# Patient Record
Sex: FEMALE | Race: WHITE
[De-identification: names, ages, dates, MRNs, and addresses within clinical notes are randomized per-mention and may not be internally consistent; named-entity substitution may affect disease eponyms.]

---

## 2017-02-24 ENCOUNTER — RECORDS - HEALTHEAST (OUTPATIENT)
Dept: ADMINISTRATIVE | Facility: OTHER | Age: 32
End: 2017-02-24

## 2017-03-03 ENCOUNTER — TELEPHONE (OUTPATIENT)
Dept: NURSING | Facility: CLINIC | Age: 32
End: 2017-03-03

## 2017-03-03 NOTE — TELEPHONE ENCOUNTER
Call Type: Triage Call    Presenting Problem: Caller had a question regarding a medication  she's taking(non [facility] MD, nothing noted in epic) and an OTC med. I  advised her to contact the pharmacy.  Triage Note:  Guideline Title: Medication Questions - Adult  Recommended Disposition: Provide Health Information  Original Inclination: Wanted to speak with a nurse  Override Disposition:  Intended Action: Follow advice given  Physician Contacted: No  Caller has medication question(s) that was answered with available resources ?  YES  Pregnant and has medication questions regarding prescribed and/or nonprescribed  medication(s) not covered by available resources ? NO  Breastfeeding and has medication questions regarding prescribed and/or  nonprescribed medication(s) not covered by available resources ? NO  Requested information on how to safely dispose of  or unused medications ?  NO  Sign(s) or symptom(s) associated with a diagnosed condition or with a new illness  ? NO  Prescription ordered today and not available at pharmacy putting patient at  clinical risk ? NO  Recurrence of a symptom(s) or illness post prescribed medication treatment AND  provider instructed patient to call if symptom(s) returned. ? NO  Unable to obtain prescribed medication related to available resources AND  situation poses immediate clinical risk ? NO  Pharmacy calling to clarify prescription order. ? NO  Requests refill of prescribed medication that does NOT have a valid refill; lack  of medication may cause clinical risk to patient if not available. ? NO  Has questions about prescribed and/or nonprescribed medications not covered by  available resources ? NO  Pharmacy calling with prescription question; answered per department policy. ? NO  Requests refill of prescribed medication without valid refills OR requests refill  of prescribed medication with valid refills but does not have prescription number  (no RX container); lack of  medication does not put patient at clinical risk ? NO  Physician Instructions:  Care Advice:

## 2017-03-24 ENCOUNTER — RECORDS - HEALTHEAST (OUTPATIENT)
Dept: ADMINISTRATIVE | Facility: OTHER | Age: 32
End: 2017-03-24

## 2017-03-27 ENCOUNTER — COMMUNICATION - HEALTHEAST (OUTPATIENT)
Dept: FAMILY MEDICINE | Facility: CLINIC | Age: 32
End: 2017-03-27

## 2017-03-27 DIAGNOSIS — E78.5 HYPERLIPIDEMIA, UNSPECIFIED HYPERLIPIDEMIA TYPE: ICD-10-CM

## 2017-03-29 ENCOUNTER — AMBULATORY - HEALTHEAST (OUTPATIENT)
Dept: LAB | Facility: CLINIC | Age: 32
End: 2017-03-29

## 2017-03-29 DIAGNOSIS — E78.5 HYPERLIPIDEMIA, UNSPECIFIED HYPERLIPIDEMIA TYPE: ICD-10-CM

## 2017-03-29 LAB
CHOLEST SERPL-MCNC: 269 MG/DL
FASTING STATUS PATIENT QL REPORTED: YES
HDLC SERPL-MCNC: 74 MG/DL
LDLC SERPL CALC-MCNC: 185 MG/DL
TRIGL SERPL-MCNC: 48 MG/DL

## 2017-05-15 ENCOUNTER — OFFICE VISIT - HEALTHEAST (OUTPATIENT)
Dept: FAMILY MEDICINE | Facility: CLINIC | Age: 32
End: 2017-05-15

## 2017-05-15 DIAGNOSIS — S76.211A GROIN STRAIN, RIGHT, INITIAL ENCOUNTER: ICD-10-CM

## 2017-05-15 DIAGNOSIS — B18.2 CHRONIC HEPATITIS C WITHOUT HEPATIC COMA (H): ICD-10-CM

## 2017-05-15 DIAGNOSIS — E78.5 HYPERLIPIDEMIA, UNSPECIFIED HYPERLIPIDEMIA TYPE: ICD-10-CM

## 2017-05-16 ENCOUNTER — OFFICE VISIT - HEALTHEAST (OUTPATIENT)
Dept: ALLERGY | Facility: CLINIC | Age: 32
End: 2017-05-16

## 2017-05-16 DIAGNOSIS — J30.1 SEASONAL ALLERGIC RHINITIS DUE TO POLLEN: ICD-10-CM

## 2017-05-16 DIAGNOSIS — J30.9 ALLERGIC RHINOCONJUNCTIVITIS: ICD-10-CM

## 2017-05-16 DIAGNOSIS — H10.10 ALLERGIC RHINOCONJUNCTIVITIS: ICD-10-CM

## 2017-06-19 ENCOUNTER — RECORDS - HEALTHEAST (OUTPATIENT)
Dept: ADMINISTRATIVE | Facility: OTHER | Age: 32
End: 2017-06-19

## 2017-06-29 ENCOUNTER — RECORDS - HEALTHEAST (OUTPATIENT)
Dept: ADMINISTRATIVE | Facility: OTHER | Age: 32
End: 2017-06-29

## 2017-07-03 ENCOUNTER — RECORDS - HEALTHEAST (OUTPATIENT)
Dept: ADMINISTRATIVE | Facility: OTHER | Age: 32
End: 2017-07-03

## 2017-07-31 ENCOUNTER — RECORDS - HEALTHEAST (OUTPATIENT)
Dept: ADMINISTRATIVE | Facility: OTHER | Age: 32
End: 2017-07-31

## 2017-09-03 ENCOUNTER — HEALTH MAINTENANCE LETTER (OUTPATIENT)
Age: 32
End: 2017-09-03

## 2018-05-16 ENCOUNTER — OFFICE VISIT - HEALTHEAST (OUTPATIENT)
Dept: FAMILY MEDICINE | Facility: CLINIC | Age: 33
End: 2018-05-16

## 2018-05-16 DIAGNOSIS — Z80.3 FAMILY HISTORY OF BREAST CANCER IN FIRST DEGREE RELATIVE: ICD-10-CM

## 2018-05-16 DIAGNOSIS — R10.13 DYSPEPSIA: ICD-10-CM

## 2018-05-16 DIAGNOSIS — Z00.00 ROUTINE GENERAL MEDICAL EXAMINATION AT A HEALTH CARE FACILITY: ICD-10-CM

## 2018-05-16 LAB
CHOLEST SERPL-MCNC: 280 MG/DL
ERYTHROCYTE [DISTWIDTH] IN BLOOD BY AUTOMATED COUNT: 11.5 % (ref 11–14.5)
FASTING STATUS PATIENT QL REPORTED: NO
HCT VFR BLD AUTO: 43.5 % (ref 35–47)
HDLC SERPL-MCNC: 84 MG/DL
HGB BLD-MCNC: 14.7 G/DL (ref 12–16)
LDLC SERPL CALC-MCNC: 185 MG/DL
MCH RBC QN AUTO: 29.1 PG (ref 27–34)
MCHC RBC AUTO-ENTMCNC: 33.8 G/DL (ref 32–36)
MCV RBC AUTO: 86 FL (ref 80–100)
PLATELET # BLD AUTO: 292 THOU/UL (ref 140–440)
PMV BLD AUTO: 7.2 FL (ref 7–10)
RBC # BLD AUTO: 5.05 MILL/UL (ref 3.8–5.4)
TRIGL SERPL-MCNC: 56 MG/DL
TSH SERPL DL<=0.005 MIU/L-ACNC: 0.92 UIU/ML (ref 0.3–5)
WBC: 8.1 THOU/UL (ref 4–11)

## 2018-05-16 ASSESSMENT — MIFFLIN-ST. JEOR: SCORE: 1390.16

## 2018-05-17 LAB
HPV SOURCE: NORMAL
HUMAN PAPILLOMA VIRUS 16 DNA: NEGATIVE
HUMAN PAPILLOMA VIRUS 18 DNA: NEGATIVE
HUMAN PAPILLOMA VIRUS FINAL DIAGNOSIS: NORMAL
HUMAN PAPILLOMA VIRUS OTHER HR: NEGATIVE
SPECIMEN DESCRIPTION: NORMAL

## 2018-05-18 ENCOUNTER — COMMUNICATION - HEALTHEAST (OUTPATIENT)
Dept: ONCOLOGY | Facility: HOSPITAL | Age: 33
End: 2018-05-18

## 2018-05-22 LAB
BKR LAB AP ABNORMAL BLEEDING: NO
BKR LAB AP BIRTH CONTROL/HORMONES: NORMAL
BKR LAB AP CERVICAL APPEARANCE: NORMAL
BKR LAB AP GYN ADEQUACY: NORMAL
BKR LAB AP GYN INTERPRETATION: NORMAL
BKR LAB AP HPV REFLEX: NORMAL
BKR LAB AP LMP: NORMAL
BKR LAB AP PATIENT STATUS: NORMAL
BKR LAB AP PREVIOUS ABNORMAL: NO
BKR LAB AP PREVIOUS NORMAL: 2015
HIGH RISK?: NO
PATH REPORT.COMMENTS IMP SPEC: NORMAL
RESULT FLAG (HE HISTORICAL CONVERSION): NORMAL

## 2018-05-23 ENCOUNTER — RECORDS - HEALTHEAST (OUTPATIENT)
Dept: ADMINISTRATIVE | Facility: OTHER | Age: 33
End: 2018-05-23

## 2018-05-29 ENCOUNTER — RECORDS - HEALTHEAST (OUTPATIENT)
Dept: ADMINISTRATIVE | Facility: OTHER | Age: 33
End: 2018-05-29

## 2018-06-13 ENCOUNTER — COMMUNICATION - HEALTHEAST (OUTPATIENT)
Dept: ALLERGY | Facility: CLINIC | Age: 33
End: 2018-06-13

## 2018-06-13 DIAGNOSIS — J30.1 SEASONAL ALLERGIC RHINITIS DUE TO POLLEN: ICD-10-CM

## 2018-06-13 DIAGNOSIS — H10.10 ALLERGIC RHINOCONJUNCTIVITIS: ICD-10-CM

## 2018-06-13 DIAGNOSIS — J30.9 ALLERGIC RHINOCONJUNCTIVITIS: ICD-10-CM

## 2018-08-07 ENCOUNTER — COMMUNICATION - HEALTHEAST (OUTPATIENT)
Dept: ONCOLOGY | Facility: HOSPITAL | Age: 33
End: 2018-08-07

## 2018-11-02 ENCOUNTER — COMMUNICATION - HEALTHEAST (OUTPATIENT)
Dept: FAMILY MEDICINE | Facility: CLINIC | Age: 33
End: 2018-11-02

## 2018-11-13 ENCOUNTER — COMMUNICATION - HEALTHEAST (OUTPATIENT)
Dept: FAMILY MEDICINE | Facility: CLINIC | Age: 33
End: 2018-11-13

## 2018-11-15 ENCOUNTER — OFFICE VISIT - HEALTHEAST (OUTPATIENT)
Dept: FAMILY MEDICINE | Facility: CLINIC | Age: 33
End: 2018-11-15

## 2018-11-15 DIAGNOSIS — Z32.01 POSITIVE PREGNANCY TEST: ICD-10-CM

## 2018-11-15 DIAGNOSIS — N92.6 MISSED PERIOD: ICD-10-CM

## 2018-11-15 LAB — HCG UR QL: POSITIVE

## 2018-11-16 ENCOUNTER — HOSPITAL ENCOUNTER (OUTPATIENT)
Dept: ULTRASOUND IMAGING | Facility: HOSPITAL | Age: 33
Discharge: HOME | End: 2018-11-16
Attending: FAMILY MEDICINE

## 2018-11-16 DIAGNOSIS — Z32.01 POSITIVE PREGNANCY TEST: ICD-10-CM

## 2018-12-06 ENCOUNTER — PRENATAL OFFICE VISIT - HEALTHEAST (OUTPATIENT)
Dept: FAMILY MEDICINE | Facility: CLINIC | Age: 33
End: 2018-12-06

## 2018-12-06 DIAGNOSIS — B18.2 CHRONIC HEPATITIS C WITHOUT HEPATIC COMA (H): ICD-10-CM

## 2018-12-06 DIAGNOSIS — Z34.01 SUPERVISION OF NORMAL FIRST PREGNANCY IN FIRST TRIMESTER: ICD-10-CM

## 2018-12-06 LAB
ALBUMIN SERPL-MCNC: 3.7 G/DL (ref 3.5–5)
ALBUMIN UR-MCNC: NEGATIVE MG/DL
ALP SERPL-CCNC: 46 U/L (ref 45–120)
ALT SERPL W P-5'-P-CCNC: <9 U/L (ref 0–45)
APPEARANCE UR: ABNORMAL
AST SERPL W P-5'-P-CCNC: 10 U/L (ref 0–40)
BASOPHILS # BLD AUTO: 0.1 THOU/UL (ref 0–0.2)
BASOPHILS NFR BLD AUTO: 1 % (ref 0–2)
BILIRUB DIRECT SERPL-MCNC: 0.1 MG/DL
BILIRUB SERPL-MCNC: 0.4 MG/DL (ref 0–1)
BILIRUB UR QL STRIP: NEGATIVE
COLOR UR AUTO: YELLOW
EOSINOPHIL # BLD AUTO: 0.1 THOU/UL (ref 0–0.4)
EOSINOPHIL NFR BLD AUTO: 1 % (ref 0–6)
ERYTHROCYTE [DISTWIDTH] IN BLOOD BY AUTOMATED COUNT: 13.5 % (ref 11–14.5)
GLUCOSE UR STRIP-MCNC: NEGATIVE MG/DL
HCT VFR BLD AUTO: 39.6 % (ref 35–47)
HGB BLD-MCNC: 13.4 G/DL (ref 12–16)
HGB UR QL STRIP: NEGATIVE
HIV 1+2 AB+HIV1 P24 AG SERPL QL IA: NEGATIVE
KETONES UR STRIP-MCNC: NEGATIVE MG/DL
LEUKOCYTE ESTERASE UR QL STRIP: NEGATIVE
LYMPHOCYTES # BLD AUTO: 2.7 THOU/UL (ref 0.8–4.4)
LYMPHOCYTES NFR BLD AUTO: 26 % (ref 20–40)
MCH RBC QN AUTO: 29.1 PG (ref 27–34)
MCHC RBC AUTO-ENTMCNC: 33.8 G/DL (ref 32–36)
MCV RBC AUTO: 86 FL (ref 80–100)
MONOCYTES # BLD AUTO: 0.6 THOU/UL (ref 0–0.9)
MONOCYTES NFR BLD AUTO: 6 % (ref 2–10)
NEUTROPHILS # BLD AUTO: 7.1 THOU/UL (ref 2–7.7)
NEUTROPHILS NFR BLD AUTO: 68 % (ref 50–70)
NITRATE UR QL: NEGATIVE
PH UR STRIP: 6.5 [PH] (ref 5–8)
PLATELET # BLD AUTO: 283 THOU/UL (ref 140–440)
PMV BLD AUTO: 10 FL (ref 8.5–12.5)
PROT SERPL-MCNC: 6.9 G/DL (ref 6–8)
RBC # BLD AUTO: 4.61 MILL/UL (ref 3.8–5.4)
SP GR UR STRIP: 1.02 (ref 1–1.03)
UROBILINOGEN UR STRIP-ACNC: ABNORMAL
WBC: 10.6 THOU/UL (ref 4–11)

## 2018-12-07 ENCOUNTER — AMBULATORY - HEALTHEAST (OUTPATIENT)
Dept: LAB | Facility: CLINIC | Age: 33
End: 2018-12-07

## 2018-12-07 DIAGNOSIS — Z33.1 PREGNANCY, INCIDENTAL: ICD-10-CM

## 2018-12-07 LAB
ABO/RH(D): NORMAL
ABORH REPEAT: NORMAL
ANTIBODY SCREEN: NEGATIVE
BACTERIA SPEC CULT: NO GROWTH
HBV SURFACE AG SERPL QL IA: NEGATIVE
RUBV IGG SERPL QL IA: POSITIVE
T PALLIDUM AB SER QL: NEGATIVE
VZV IGG SER QL IA: POSITIVE

## 2018-12-10 LAB
HCV RNA SERPL NAA+PROBE-ACNC: NORMAL [IU]/ML
HCV RNA SERPL NAA+PROBE-LOG IU: NORMAL LOG IU/ML

## 2018-12-16 ENCOUNTER — RECORDS - HEALTHEAST (OUTPATIENT)
Dept: ADMINISTRATIVE | Facility: OTHER | Age: 33
End: 2018-12-16

## 2018-12-17 ENCOUNTER — COMMUNICATION - HEALTHEAST (OUTPATIENT)
Dept: FAMILY MEDICINE | Facility: CLINIC | Age: 33
End: 2018-12-17

## 2019-01-03 ENCOUNTER — PRENATAL OFFICE VISIT - HEALTHEAST (OUTPATIENT)
Dept: FAMILY MEDICINE | Facility: CLINIC | Age: 34
End: 2019-01-03

## 2019-01-03 DIAGNOSIS — K59.00 CONSTIPATION, UNSPECIFIED CONSTIPATION TYPE: ICD-10-CM

## 2019-01-03 DIAGNOSIS — Z34.02 SUPERVISION OF NORMAL FIRST PREGNANCY IN SECOND TRIMESTER: ICD-10-CM

## 2019-06-02 ENCOUNTER — COMMUNICATION - HEALTHEAST (OUTPATIENT)
Dept: ALLERGY | Facility: CLINIC | Age: 34
End: 2019-06-02

## 2019-06-02 DIAGNOSIS — H10.10 ALLERGIC RHINOCONJUNCTIVITIS: ICD-10-CM

## 2019-06-02 DIAGNOSIS — J30.9 ALLERGIC RHINOCONJUNCTIVITIS: ICD-10-CM

## 2019-06-02 DIAGNOSIS — J30.1 SEASONAL ALLERGIC RHINITIS DUE TO POLLEN: ICD-10-CM

## 2019-07-14 ENCOUNTER — HOSPITAL ENCOUNTER (OUTPATIENT)
Dept: OBGYN | Facility: HOSPITAL | Age: 34
Discharge: HOME | End: 2019-07-14

## 2019-07-14 ASSESSMENT — MIFFLIN-ST. JEOR: SCORE: 1498.74

## 2019-07-15 ENCOUNTER — ANESTHESIA - HEALTHEAST (OUTPATIENT)
Dept: OBGYN | Facility: HOSPITAL | Age: 34
End: 2019-07-15

## 2019-07-15 ENCOUNTER — SURGERY - HEALTHEAST (OUTPATIENT)
Dept: OBGYN | Facility: HOSPITAL | Age: 34
End: 2019-07-15

## 2019-07-17 ENCOUNTER — HOME CARE/HOSPICE - HEALTHEAST (OUTPATIENT)
Dept: HOME HEALTH SERVICES | Facility: HOME HEALTH | Age: 34
End: 2019-07-17

## 2019-07-20 ENCOUNTER — RECORDS - HEALTHEAST (OUTPATIENT)
Dept: ADMINISTRATIVE | Facility: OTHER | Age: 34
End: 2019-07-20

## 2019-07-20 ENCOUNTER — HOME CARE/HOSPICE - HEALTHEAST (OUTPATIENT)
Dept: HOME HEALTH SERVICES | Facility: HOME HEALTH | Age: 34
End: 2019-07-20

## 2019-09-04 ENCOUNTER — SURGERY - HEALTHEAST (OUTPATIENT)
Dept: SURGERY | Facility: CLINIC | Age: 34
End: 2019-09-04

## 2019-09-04 ENCOUNTER — ANESTHESIA - HEALTHEAST (OUTPATIENT)
Dept: SURGERY | Facility: CLINIC | Age: 34
End: 2019-09-04

## 2019-09-04 ASSESSMENT — MIFFLIN-ST. JEOR: SCORE: 1460.64

## 2019-09-05 ASSESSMENT — MIFFLIN-ST. JEOR: SCORE: 1437.96

## 2019-11-08 ENCOUNTER — HEALTH MAINTENANCE LETTER (OUTPATIENT)
Age: 34
End: 2019-11-08

## 2020-02-23 ENCOUNTER — HEALTH MAINTENANCE LETTER (OUTPATIENT)
Age: 35
End: 2020-02-23

## 2020-07-21 ENCOUNTER — RECORDS - HEALTHEAST (OUTPATIENT)
Dept: ADMINISTRATIVE | Facility: OTHER | Age: 35
End: 2020-07-21

## 2020-10-21 ENCOUNTER — RECORDS - HEALTHEAST (OUTPATIENT)
Dept: LAB | Facility: CLINIC | Age: 35
End: 2020-10-21

## 2020-10-21 LAB
CHOLEST SERPL-MCNC: 201 MG/DL
FASTING STATUS PATIENT QL REPORTED: ABNORMAL
HDLC SERPL-MCNC: 61 MG/DL
LDLC SERPL CALC-MCNC: 129 MG/DL
TRIGL SERPL-MCNC: 57 MG/DL

## 2020-12-06 ENCOUNTER — HEALTH MAINTENANCE LETTER (OUTPATIENT)
Age: 35
End: 2020-12-06

## 2021-05-30 VITALS — BODY MASS INDEX: 23.45 KG/M2 | WEIGHT: 149.7 LBS

## 2021-05-30 NOTE — ANESTHESIA PROCEDURE NOTES
Peripheral Block    Patient location during procedure: OR  Start time: 7/15/2019 11:32 AM  End time: 7/15/2019 11:36 AM  post-op analgesia per surgeon order as noted in medical record  Staffing:  Performing  Anesthesiologist: Hardik Cotton MD  Preanesthetic Checklist  Completed: patient identified, site marked, risks, benefits, and alternatives discussed, timeout performed, consent obtained, airway assessed, oxygen available, suction available, emergency drugs available and hand hygiene performed  Peripheral Block  Block type: other, TAP  Prep: ChloraPrep  Patient position: supine  Patient monitoring: blood pressure, heart rate, continuous pulse oximetry and cardiac monitor  Laterality: bilateral, same technique used bilaterally  Injection technique: ultrasound guided    Ultrasound used to visualize needle placement in proximity to nerve being blocked: yes   Permanent ultrasound image captured for medical record      Needle  Needle type: Stimuplex   Needle gauge: 20G  Needle length: 4 in

## 2021-05-30 NOTE — ANESTHESIA CARE TRANSFER NOTE
Last vitals:   Vitals:    07/15/19 1143   BP: 99/53   Pulse: 66   Resp: 16   Temp: 36.9  C (98.5  F)   SpO2: 99%     Pt brought to room on room air. Monitors applied. VSS.    Patient's level of consciousness is awake  Spontaneous respirations: yes  Maintains airway independently: yes  Dentition unchanged: yes  Oropharynx: oropharynx clear of all foreign objects    QCDR Measures:  ASA# 20 - Surgical Safety Checklist: WHO surgical safety checklist completed prior to induction    PQRS# 430 - Adult PONV Prevention: NA - Not adult patient, not GA or 3 or more risk factors NOT present  ASA# 8 - Peds PONV Prevention: NA - Not pediatric patient, not GA or 2 or more risk factors NOT present  PQRS# 424 - Alysia-op Temp Management: 4559F - At least one body temp DOCUMENTED => 35.5C or 95.9F within required timeframe  PQRS# 426 - PACU Transfer Protocol: - Transfer of care checklist used  ASA# 14 - Acute Post-op Pain: ASA14B - Patient did NOT experience pain >= 7 out of 10

## 2021-05-30 NOTE — ANESTHESIA PROCEDURE NOTES
Spinal Block    Patient location during procedure: OR  Start time: 7/15/2019 10:48 AM  End time: 7/15/2019 10:51 AM  Reason for block: primary anesthetic    Staffing:  Performing  Anesthesiologist: Hardik Cotton MD    Preanesthetic Checklist  Completed: patient identified, risks, benefits, and alternatives discussed, timeout performed, consent obtained, airway assessed, oxygen available, suction available, emergency drugs available and hand hygiene performed  Spinal Block  Patient position: right lateral decubitus  Prep: Betadine  Patient monitoring: heart rate and continuous pulse ox  Approach: midline  Location: L2-3  Injection technique: single-shot  Needle type: pencil-tip   Needle gauge: 24 G    Assessment  Sensory level: T6

## 2021-05-30 NOTE — ANESTHESIA PREPROCEDURE EVALUATION
Anesthesia Evaluation      Patient summary reviewed   No history of anesthetic complications     Airway   Mallampati: I  Neck ROM: full   Pulmonary - negative ROS and normal exam                          Cardiovascular - normal exam  Exercise tolerance: > or = 4 METS  (-) murmur  Rhythm: regular  Rate: normal,    no murmur      Neuro/Psych - negative ROS     Endo/Other    (+) pregnant     GI/Hepatic/Renal - negative ROS           Dental - normal exam                        Anesthesia Plan  Planned anesthetic: spinal, peripheral nerve block and ITN    ASA 2 - emergent     Anesthetic plan and risks discussed with: patient and spouse  Anesthesia plan special considerations: antiemetics,   Post-op plan: routine recovery

## 2021-05-30 NOTE — ANESTHESIA POSTPROCEDURE EVALUATION
Patient: Gabriela Alba   SECTION  Anesthesia type: spinal    Patient location: Labor and Delivery  Last vitals:   Vitals Value Taken Time   /65 7/15/2019  4:52 PM   Temp 36.7  C (98.1  F) 7/15/2019  4:52 PM   Pulse 64 7/15/2019  5:09 PM   Resp 20 7/15/2019  4:52 PM   SpO2 100 % 7/15/2019  5:09 PM   Vitals shown include unvalidated device data.  Post vital signs: stable  Level of consciousness: awake and responds to simple questions  Post-anesthesia pain: pain controlled  Post-anesthesia nausea and vomiting: no  Pulmonary: unassisted, return to baseline  Cardiovascular: stable and blood pressure at baseline  Hydration: adequate  Anesthetic events: no    QCDR Measures:  ASA# 11 - Alysia-op Cardiac Arrest: ASA11B - Patient did NOT experience unanticipated cardiac arrest  ASA# 12 - Alysia-op Mortality Rate: ASA12B - Patient did NOT die  ASA# 13 - PACU Re-Intubation Rate: ASA13B - Patient did NOT require a new airway mgmt  ASA# 10 - Composite Anes Safety: ASA10A - No serious adverse event    Additional Notes:  Neuraxial block wearing off, residual numbness / weakness. Pain controlled. Denies headache or back pain. No further questions or concerns. Instructed that we are available  should she have questions pertaining to her epidural.

## 2021-05-31 VITALS — BODY MASS INDEX: 23.43 KG/M2 | WEIGHT: 149.6 LBS

## 2021-06-01 VITALS — BODY MASS INDEX: 23.07 KG/M2 | HEIGHT: 67 IN | WEIGHT: 147 LBS

## 2021-06-01 NOTE — ANESTHESIA PREPROCEDURE EVALUATION
Anesthesia Evaluation      Patient summary reviewed   No history of anesthetic complications     Airway   Mallampati: II  Neck ROM: full   Pulmonary - negative ROS and normal exam                          Cardiovascular - negative ROS and normal exam   Neuro/Psych - negative ROS     Endo/Other - negative ROS      GI/Hepatic/Renal - negative ROS           Dental - normal exam                        Anesthesia Plan  Planned anesthetic: MAC    ASA 1 - emergent     Anesthetic plan and risks discussed with: patient    Post-op plan: routine recovery

## 2021-06-01 NOTE — ANESTHESIA CARE TRANSFER NOTE
Last vitals:   Vitals:    09/04/19 2213   BP: 111/58   Pulse: 72   Resp: 12   Temp: 36.5  C (97.7  F)   SpO2: 96%     Patient's level of consciousness is awake  Spontaneous respirations: yes  Maintains airway independently: yes  Dentition unchanged: yes  Oropharynx: oropharynx clear of all foreign objects    QCDR Measures:  ASA# 20 - Surgical Safety Checklist: WHO surgical safety checklist completed prior to induction    PQRS# 430 - Adult PONV Prevention: 4558F - Pt received => 2 anti-emetic agents (different classes) preop & intraop  ASA# 8 - Peds PONV Prevention: NA - Not pediatric patient, not GA or 2 or more risk factors NOT present  PQRS# 424 - Alysia-op Temp Management: 4559F - At least one body temp DOCUMENTED => 35.5C or 95.9F within required timeframe  PQRS# 426 - PACU Transfer Protocol: - Transfer of care checklist used  ASA# 14 - Acute Post-op Pain: ASA14B - Patient did NOT experience pain >= 7 out of 10

## 2021-06-01 NOTE — ANESTHESIA POSTPROCEDURE EVALUATION
Patient: Gabriela Alba  DILATION AND CURETTAGE, UTERUS  Anesthesia type: MAC    Patient location: Phase II Recovery  Last vitals:   Vitals Value Taken Time   /58 9/4/2019 10:13 PM   Temp 36.5  C (97.7  F) 9/4/2019 10:13 PM   Pulse 72 9/4/2019 10:13 PM   Resp 12 9/4/2019 10:13 PM   SpO2 96 % 9/4/2019 10:13 PM     Post vital signs: stable  Level of consciousness: awake and responds to simple questions  Post-anesthesia pain: pain controlled  Post-anesthesia nausea and vomiting: no  Pulmonary: unassisted, return to baseline  Cardiovascular: stable and blood pressure at baseline  Hydration: adequate  Anesthetic events: no    QCDR Measures:  ASA# 11 - Alysia-op Cardiac Arrest: ASA11B - Patient did NOT experience unanticipated cardiac arrest  ASA# 12 - Alysia-op Mortality Rate: ASA12B - Patient did NOT die  ASA# 13 - PACU Re-Intubation Rate: ASA13B - Patient did NOT require a new airway mgmt  ASA# 10 - Composite Anes Safety: ASA10A - No serious adverse event    Additional Notes:

## 2021-06-02 VITALS — BODY MASS INDEX: 23.07 KG/M2 | WEIGHT: 146.13 LBS

## 2021-06-02 VITALS — WEIGHT: 147 LBS | BODY MASS INDEX: 23.21 KG/M2

## 2021-06-02 VITALS — BODY MASS INDEX: 23.76 KG/M2 | WEIGHT: 150.5 LBS

## 2021-06-03 VITALS — BODY MASS INDEX: 26.68 KG/M2 | WEIGHT: 170 LBS | HEIGHT: 67 IN

## 2021-06-03 VITALS — BODY MASS INDEX: 24.58 KG/M2 | WEIGHT: 156.6 LBS | HEIGHT: 67 IN

## 2021-06-10 NOTE — PROGRESS NOTES
Assessment & Plan:  1. Groin strain, right, initial encounter  Patient with right groin most likely due to muscle strain.  No evidence of a hernia.  Recommend treatment with NSAID  And heat.  If no improvement in 1 week - will get US of groin    2. Chronic hepatitis C without hepatic coma  Patient being followed by GI for Hep c.  After 2 months of treatment - she appears to be in remission.      3. Hyperlipidemia, unspecified hyperlipidemia type  Patient and I  Had a long discussion on risks of elevated cholesterol and tretment options.  She has recently lost 10 pounds wihtout any improvement of chol.  Suspect she has genetic cause of elevated chol.  Discussed treatment with Statins.  She is planning to get pregnant in 2 years.  Will not start statin until she is done having children.        No orders of the defined types were placed in this encounter.    Medications Discontinued During This Encounter   Medication Reason     cetirizine (ZYRTEC) 10 MG tablet      ketoconazole (NIZORAL) 2 % cream      ranitidine (ZANTAC) 150 MG tablet        No Follow-up on file.        Chief Complaint:   Chief Complaint   Patient presents with     Hernia     concerns of possible hernia in right groin, pain, x 5 days ago       History of Present Illness:  Gabriela is a 31 y.o. female presenting to the clinic today for RLQ pain. She has been having pain in the right groin area and RLQ for the last 5 days. She recalls the same pain 5 months ago that resolved on its own. She describes the pain as a constant dull pain. She has used ibuprofen, which helps, because she can feel when the ibuprofen wares off. She notes that the pain radiates into her right inner leg. She can exacerbate the pain by sucking in her stomach or using her abs. She denies previous surgeries, hernias or ovarian cysts. She denies fevers, chills or sweats, pregnancy, nausea, vomiting or dysuria. She is [facility] through her menstrual cycle and taking an OCP. She admits  to constipation and notes passing a bowel movement is occasionally painful She denies any lifting or new activities.      Hep C: She was diagnosed with hepatitis C earlier this month during a screening for life insurance. She has been seen by a specialist and [facility] and completed one round of treatment of Harvoni.    Review of Systems:  Her cholesterol has been high. She had been on the elimination diet last year when her cholesterol was lower. She no longer has stomach pains or acid reflux. All other systems are negative.     PFSH:  She denies a family history of stroke or heart disease. She has a maternal history of high cholesterol. She is interested in having kids within the next couple of years.     Tobacco Use:  History   Smoking Status     Never Smoker   Smokeless Tobacco     Not on file       Vitals:  Vitals:    05/15/17 1413   BP: 102/60   Patient Site: Right Arm   Patient Position: Sitting   Cuff Size: Adult Regular   Pulse: 76   Resp: 16   Temp: 98.4  F (36.9  C)   TempSrc: Oral   Weight: 149 lb 11.2 oz (67.9 kg)     Wt Readings from Last 3 Encounters:   05/15/17 149 lb 11.2 oz (67.9 kg)   08/01/16 165 lb 12 oz (75.2 kg)   07/21/15 168 lb 12.8 oz (76.6 kg)     Body mass index is 23.45 kg/(m^2).      Physical Exam:  Constitutional:  Reveals an alert, cooperative, pleasant female in no acute distress.  Vitals:  Per nursing notes.  Cardiac:  Regular rate and rhythm without murmurs, rubs, or gallops.   Lungs: Clear.  Respiratory effort normal.  Abdomen:  non-tender, non-distended.  Neither liver nor spleen palpable. No palpable mass in  Inguinal canal.  No pain or mass in femoral canal.  Min TTP right groin  Neurologic:  No gross focal deficits.    Psychiatric:  Mood appropriate, memory intact.     Data Reviewed:  Additional history summarized (from old records or history from someone other than the patient or another healthcare provider) (2 TOTAL): None.     Decision to obtain extra information (old records  requested or history from another person or accessing Care Everywhere) (1 TOTAL): None.     Radiology tests summarized or ordered (XRAY/CT/MRI/DXA) (1 TOTAL): None.    Labs reviewed or ordered (1 TOTAL): Reviewed labs    Medicine tests summarized or ordered (EKG/ECHO) (1 TOTAL): None.    Independent review of EKG or X-Ray (2 EACH): None.       The visit lasted a total of 28 minutes face to face with the patient. Over 50% of the time was spent counseling and educating the patient about symptom management.    I, Annette Morrow, am scribing for and in the presence of, Dr. Stokes.    I, Dr. Stokes, personally performed the services described in this documentation, as scribed by Annette Morrow in my presence, and it is both accurate and complete.    Medications:  No current outpatient prescriptions on file.     No current facility-administered medications for this visit.        Total Data Points: 1

## 2021-06-10 NOTE — PROGRESS NOTES
Assessment:      Allergic rhinitis with sensitivity to tree pollen (Spring) and ragweed (late summer-Fall freeze)    Plan:    Reviewed avoidance measures.  Continue zyrtec 10mg AM and PM  Start fluticasone 1 spray each nostril AM and PM  Start montelukast 10mg daily.  Azelastine 1 drop each eye every 12 hours as needed    Next Spring, start fluticasone before symptoms develop.  Recommend starting third week of March.  ____________________________________________________________________________     Patient is here for environmental allergies.  She has a history of allergic rhinitis with sensitivity to tree pollen and ragweed.  She had previous allergy testing 2015 when I saw her last.  This spring she has been having significant allergy symptoms.  She describes nasal congestion, rhinorrhea, fatigue, sneezing, itchy throat and ears.  She reports that she has had these symptoms over the past 4 years in the spring only.  It is worse when she is outdoors.  No difficulty breathing or wheezing.  She is using cetirizine 10 mg twice a day.    Physical Exam:  General:  Alert and oriented.  Eyes:  Sclera clear. Nose: pale boggy mucosal membranes.  Throat:  pink moist, no lesions.  Lungs:  clear to auscultation    This transcription uses voice recognition software, which may contain typographical errors.

## 2021-06-18 NOTE — PROGRESS NOTES
Assessment:     1. Routine general medical examination at a health care facility  Immunizations reviewed and updated .  Alcohol use, safety and moderation discussed.  Recommended adequate calcium intake/osteoporosis prevention.  Discussed colon cancer screening at age 50, 45 if -American.  Diet and exercise reviewed, including goal of aerobic exercise 30-90 minutes most days of the week, moderation of portions sizes, avoiding eating out and fast food and increase in fruits and vegetables.  Discussed sun protection.  Discussed weight management.    Preconception counseling.  Recommend prenatal vitamins discussed appropriate folic acid and other screening    - HM2(CBC w/o Differential)  - Thyroid Cascade  - Lipid [place]  - Gynecologic Cytology (PAP Smear)  - HPV High Risk DNA Cervical    2. Family history of breast cancer in first degree relative  Mother recently was diagnosed with breast cancer.  Patient does have some of the genetic markers.  Will refer her to a genetics clinic for further counseling and guidance    - Ambulatory referral to Genetics    3. Dyspepsia  Patient with dyspepsia.  Has had H pylori in the past will test her stool to see if she has a recurrence of  - H. pylori Antigen, Stool; Future     Plan:      I recommended she eat a healthy diet and exercise on a regular basis.      Subjective:     Gabriela Alba is a 32 y.o. female who presents for an annual exam.     Family H/O Breast Cancer: She is concerned about developing breast cancer as her mother was recently diagnosed with it. She had to translate her mother's medical records because they are in [place] so she is unsure exactly which type of breast cancer it was.     Desire to get Pregnant: She finished her last birth control pill and is on her period at this time. She thinks she is ready to try and get pregnant. She has not started prenatal vitamins. She is in a safe and healthy relationship with her , who is 55 years old.  She wonders if her  should be screened for anything specific before trying to get pregnant. Her  has had a child previously. She wonders if her high cholesterol will effect a pregnancy.     REVIEW OF SYSTEMS:   She has been treated for hepatitis C and it is all clear. She continues to take Zyrtec and Singulair for her seasonal allergies. All other systems are negative.     PFSH:  Family: Her mother was recently diagnosed with breast cancer - HER2-negative.    Medical: She had an  when she was younger in [place].     Social: She was born in [place], where her mother still lives. She has been  to her  for 11 years. She met her  when she was working on as a ; they got  10 days later. Her  has a teenage daughter that she helped raise.     The patient reports that there is not domestic violence in her life.     Immunization History   Administered Date(s) Administered     Tdap 11/10/2006, 2016     Immunization status: up to date and documented.    Gynecologic History  Patient's last menstrual period was 05/15/2018.  Contraception: none  Last Pap: 2/5/15. Results were: normal  Last mammogram: N/A.     OB History   No data available       Current Outpatient Prescriptions   Medication Sig Dispense Refill     azelastine (OPTIVAR) 0.05 % ophthalmic solution Administer 1 drop to both eyes 2 (two) times a day as needed. 6 mL 5     cetirizine (ZYRTEC) 10 MG tablet Take 10 mg by mouth 2 (two) times a day.       montelukast (SINGULAIR) 10 mg tablet Take 1 tablet (10 mg total) by mouth daily. 30 tablet 5     No current facility-administered medications for this visit.      No past medical history on file.  No past surgical history on file.  Review of patient's allergies indicates no known allergies.  Family History   Problem Relation Age of Onset     Breast cancer Mother      Social History     Social History     Marital status:      Spouse name: N/A      "Number of children: N/A     Years of education: N/A     Occupational History     Not on file.     Social History Main Topics     Smoking status: Never Smoker     Smokeless tobacco: Never Used     Alcohol use Not on file     Drug use: Not on file     Sexual activity: Not on file     Other Topics Concern     Not on file     Social History Narrative       Objective:      BP 96/62 (Patient Site: Right Arm, Patient Position: Sitting, Cuff Size: Adult Regular)  Pulse 80  Temp 97.8  F (36.6  C) (Oral)   Resp 16  Ht 5' 6.73\" (1.695 m)  Wt 147 lb (66.7 kg)  LMP 05/15/2018  BMI 23.21 kg/m2    Physical Exam:  General Appearance: Alert, cooperative, no distress.  Head: Normocephalic, without obvious abnormality, atraumatic  Eyes: PERRL, conjunctiva/corneas clear, EOM's intact  Ears: Normal TM's and external ear canals, both ears  Nose: Nares normal, septum midline,mucosa normal, no drainage  Throat: Lips, mucosa, and tongue normal  Neck: Supple, symmetrical, trachea midline, no adenopathy;  thyroid: not enlarged, symmetric, no tenderness/mass/nodules  Back: Symmetric, no curvature, ROM normal, no CVA tenderness  Lungs: Clear to auscultation bilaterally, respirations unlabored  Breasts: No breast masses, tenderness, asymmetry, or nipple discharge.  Heart: Regular rate and rhythm, S1 and S2 normal, no murmur, rub, or gallop, Abdomen: Soft, non-tender, bowel sounds active all four quadrants,  no masses, no organomegaly  Pelvic: Normal-appearing female genitalia.  No adnexal masses or cervical motion tenderness on bimanual exam.  Extremities: Extremities normal, atraumatic, no cyanosis or edema  Skin: Skin color, texture, turgor normal, no rashes or lesions. 1 cm x 0.5 cm nevus on right shoulder blade. 0.5 x 0.5 slightly pink nevus on right breast.   Lymph nodes: Cervical, supraclavicular, and axillary nodes normal  Neurologic: Normal  Psych: Normal affect.  Does not appear anxious or depressed.     ADDITIONAL HISTORY " SUMMARIZED (2): None.  DECISION TO OBTAIN EXTRA INFORMATION (1): Researched types of breast cancer online.   RADIOLOGY TESTS (1): None.  LABS (1): Labs ordered and reviewed.   MEDICINE TESTS (1): None.  INDEPENDENT REVIEW (2 each): None.     The visit lasted a total of 38 minutes face to face with the patient. Over 50% of the time was spent counseling and educating the patient about family history of breast cancer, desire to get pregnant, and health maintenance.    I, Alexandra Severson, am scribing for and in the presence of, Dr. Stokes.    I, Dr. Emely Stokes MD, personally performed the services described in this documentation, as scribed by Alexandra Severson in my presence, and it is both accurate and complete.    Total data points: 2

## 2021-06-21 NOTE — PROGRESS NOTES
Assessment:        1. Missed period  Pregnancy (Beta-hCG, Qual), Urine   2. Positive pregnancy test  US OB < 14 Weeks    Current medications, medical problems, and pregnancy history were addressed today they relate to the patient s status.       Plan:       The following changes were made to patient s medications None and added PNV       Counseled patient on early pregnancy issues and plans for continued pregnancy:  - OTC meds safe in early pregnancy,  - importance of prenatal vitamins, and routine activities without restrictions.    - importance of ETOH abstinence and no other drug use  Informed of signs and symptoms of miscarriage and to call with questions of spotting/bleeding management and possible need to come in for evaluation before routine visit at 10-12 weeks.   Set 30 minute visit at 12 weeks pregnant.  Discussed information in the OB packet.  Will let us know if early genetic testing wanted.  Can call for script of Phenergan or Reglan if needed for nausea.  Early ultrasound if needed or wishes, can check with insurance coverage.  Twenty-five minutes spent with this patient, at least 50% in coordination of care or counseling reguarding the topics discussed in note.        No Follow-up on file.       Subjective:      Gabriela Alba is a 33 y.o. female who presents for evaluation of amenorrhea.  Pregnancy is desired.   Current contraception:  no method  Patient had a pregnancy test:  positive on Nov 7  Current symptoms include: positive home pregnancy test.      Patient's last menstrual period was 09/28/2018 (exact date).  The following portions of the patient's history were reviewed and updated as appropriate: allergies, current medications, past family history, past medical history, past social history, past surgical history and problem list.    Patient Active Problem List   Diagnosis     Chronic Reflux Esophagitis     Chronic hepatitis C without hepatic coma (H)     Current Outpatient Medications    Medication Sig Dispense Refill     azelastine (OPTIVAR) 0.05 % ophthalmic solution Administer 1 drop to both eyes 2 (two) times a day as needed. 6 mL 5     cetirizine (ZYRTEC) 10 MG tablet Take 10 mg by mouth 2 (two) times a day.       montelukast (SINGULAIR) 10 mg tablet TAKE 1 TABLET(10 MG) BY MOUTH DAILY 30 tablet 0     No current facility-administered medications for this visit.              Objective:        Vitals:    11/15/18 1253   BP: 105/68   Pulse: 82   Resp: 14   Temp: 99.6  F (37.6  C)   TempSrc: Oral   SpO2: 100%   Weight: 147 lb (66.7 kg)     Physical Exam:  General Appearance: Alert, cooperative, no distress, appears stated age  Heart: Regular rate and rhythm, S1 and S2 normal, no murmur, rub, or gallop, Abdomen: Soft, non-tender, bowel sounds active all four quadrants, no masses, no organomegaly  Lungs: Clear to auscultation bilaterally, respirations unlabored  Abdomen: Soft, non-tender, bowel sounds active all four quadrants,  no masses, no organomegaly  Pelvic:Not examined  Extremities: Extremities normal, atraumatic, no cyanosis or edema  Skin: Skin color, texture, turgor normal, no rashes or lesions    Results for orders placed or performed in visit on 11/15/18   Pregnancy (Beta-hCG, Qual), Urine   Result Value Ref Range    Pregnancy Test, Urine Positive (!) Negative

## 2021-06-22 NOTE — PROGRESS NOTES
PRENATAL VISIT   FIRST OBSTETRICAL EXAM - OB    Assessment / Impression     1. Supervision of normal first pregnancy in first trimester  Routine labs ordered.  Discussed genetic screening.   Patient desires cell free DNA testing.  As she is 1 day early for it she will come back and get her labs drawn at a future time  - ABO/RH Typing (OP order)  - Hepatitis B Surface antigen (HBsAG)  - HIV Antigen/Antibody Screening Cascade  - HM1(CBC and Differential)  - HML Antibody Screen  - RPR  - Rubella Immune Status (IgG)  - Urinalysis Macroscopic  - Culture, Urine  - HM1 (CBC with Diff)  - Varicella Zoster Immune Status Antibody, IgG    2. Chronic hepatitis C without hepatic coma (H)  Patient had a history of hepatitis C had gone through treatment and cure.  Will check levels today  - Hepatitis C RNA Quantitation by RT-PCR($$$)  - Hepatic Profile      Normal first prenatal visit at Unknown  Discussed orientation, general information, lifestyle, nutrition, exercise,warning signs, resources, lab testing, risk screening and discussed cystic fibrosis screening with patient.  Questions answered.    Plan:        Initial labs drawn.  Prenatal vitamins.  Problem list reviewed and updated.  Genetic screening test options discussed:  Patient elects Cell free DNA test  Role of ultrasound in pregnancy discussed; fetal survey: requested.  Follow up: No Follow-up on file.      Subjective:    Gabriela Alba is a 33 y.o.  here today for her First Obstetrical Exam.   OB History    Para Term  AB Living   2       1     SAB TAB Ectopic Multiple Live Births                  # Outcome Date GA Lbr Corwin/2nd Weight Sex Delivery Anes PTL Lv   2 Current            1 AB                   Expected Date of Delivery: Not found.    No past medical history on file.  No past surgical history on file.  Social History     Tobacco Use     Smoking status: Never Smoker     Smokeless tobacco: Never Used   Substance Use Topics     Alcohol use: No      Frequency: Never     Drug use: No     Current Outpatient Medications   Medication Sig Dispense Refill     azelastine (OPTIVAR) 0.05 % ophthalmic solution Administer 1 drop to both eyes 2 (two) times a day as needed. 6 mL 5     cetirizine (ZYRTEC) 10 MG tablet Take 10 mg by mouth 2 (two) times a day.       montelukast (SINGULAIR) 10 mg tablet TAKE 1 TABLET(10 MG) BY MOUTH DAILY 30 tablet 0     No current facility-administered medications for this visit.      No Known Allergies          High Risk Behavior: None    Review of Systems  General:  Denies problem  Eyes: Denies problem  Ears/Nose/Throat: Denies problem  Cardiovascular: Denies problem  Respiratory:  Denies problem  Gastrointestinal:  Denies problem, Genitourinary: Denies problem  Musculoskeletal:  Denies problem  Skin: Denies problem  Neurologic: Denies problem  Psychiatric: Denies problem  Endocrine: Denies problem  Heme/Lymphatic: Denies problem   Allergic/Immunologic: Denies problem       Objective:   Objective    Vitals:    12/06/18 1242   BP: 108/67   Pulse: 75   Resp: 16   Temp: 97.2  F (36.2  C)   TempSrc: Oral   Weight: 146 lb 2 oz (66.3 kg)     Physical Exam:  General Appearance: Alert, cooperative, no distress, appears stated age  Head: Normocephalic, without obvious abnormality, atraumatic  Eyes: PERRL, conjunctiva/corneas clear, EOM's intact  Ears: Normal TM's and external ear canals, both ears  Nose: Nares normal, septum midline,mucosa normal, no drainage  Throat: Lips, mucosa, and tongue normal; teeth and gums normal  Neck: Supple, symmetrical, trachea midline, no adenopathy;  thyroid: not enlarged, symmetric, no tenderness/mass/nodules; no carotid bruit or JVD  Back: Symmetric, no curvature, ROM normal, no CVA tenderness  Lungs: Clear to auscultation bilaterally, respirations unlabored  Breasts: No breast masses, tenderness, asymmetry, or nipple discharge.  Heart: Regular rate and rhythm, S1 and S2 normal, no murmur, rub, or gallop,    Abdomen: Soft, non-tender, bowel sounds active all four quadrants,  no masses, no organomegaly  Pelvic:Not examined  Extremities: Extremities normal, atraumatic, no cyanosis or edema  Skin: Skin color, texture, turgor normal, no rashes or lesions  Lymph nodes: Cervical, supraclavicular, and axillary nodes normal  Neurologic: Normal     Lab:   Results for orders placed or performed in visit on 11/15/18   Pregnancy (Beta-hCG, Qual), Urine   Result Value Ref Range    Pregnancy Test, Urine Positive (!) Negative

## 2021-06-22 NOTE — PROGRESS NOTES
RETURN OB VISIT SUMMARY    SUBJECTIVE:  The patient has no unusual complaints and we discussed results of her labs and her inatal  ( cell free DNA)exam. SHe occ has low back pain.  .    OBJECTIVE:  Physical Exam: See prenatal flowsheet      ASSESSMENT:  Normal pregnancy    PLAN:  Routine prenatal care  F/u 4 weeks - will do fetal survey

## 2021-06-22 NOTE — PATIENT INSTRUCTIONS - HE
"  Patient Education   HEALTHY PREGNANCY CARE: 14 to 18 WEEKS PREGNANT    During this time, you may start to \"show,\" so that you look pregnant to people around you. You may also notice some changes in your skin, such as an increase in acne on your face. You may notice your heart pounding, a sharp stretching ache on either side of your lower abdomen (round ligament), and more vaginal discharge.     Your baby's nerves and muscles are maturing. Sex organs are recognizable. Your baby is now able to pass urine, and your baby's first stool (meconium) is starting to collect in his or her intestines. Hair is also beginning to grow on your baby's head. Your baby is moving freely inside your uterus but you may not be able to feel it until 18-20 weeks.    Continue making healthy choices for your baby during pregnancy, including good nutrition, exercise and a safe environment free from smoking, alcohol and drugs.    Your genetic screening with a quad screen blood test may have been done today.    Watch for warning signs and contact your midwife or physician at the clinic with any concerns at [facility]/OB at Phone: [phone]. For example, call about cramping, bleeding, abdominal pain, watery vaginal discharge, or if you are unable to keep fluids down for more than 24 hours due to vomiting.   If it's after clinic hours, physician patients should call the [facility] at [phone] ([phone]); midwife patients should call their answering service at [phone].    How can you care for yourself at home?   You can refer to the Starting Out Right book or find it online at [email or web address] or [email or web address]     You can sign up for a weekly parenting e-mail that gives support, tips and advice from health care professionals that starts with " pregnancy and continues through the toddler years. To register, go to [email or web address] at any time during your pregnancy.    Miralax for colnstipation

## 2021-09-25 ENCOUNTER — HEALTH MAINTENANCE LETTER (OUTPATIENT)
Age: 36
End: 2021-09-25

## 2022-01-15 ENCOUNTER — HEALTH MAINTENANCE LETTER (OUTPATIENT)
Age: 37
End: 2022-01-15

## 2023-01-07 ENCOUNTER — HEALTH MAINTENANCE LETTER (OUTPATIENT)
Age: 38
End: 2023-01-07

## 2023-04-22 ENCOUNTER — HEALTH MAINTENANCE LETTER (OUTPATIENT)
Age: 38
End: 2023-04-22